# Patient Record
Sex: FEMALE | Race: BLACK OR AFRICAN AMERICAN | NOT HISPANIC OR LATINO | URBAN - METROPOLITAN AREA
[De-identification: names, ages, dates, MRNs, and addresses within clinical notes are randomized per-mention and may not be internally consistent; named-entity substitution may affect disease eponyms.]

---

## 2020-01-04 ENCOUNTER — EMERGENCY (EMERGENCY)
Facility: HOSPITAL | Age: 35
LOS: 0 days | Discharge: HOME | End: 2020-01-04
Attending: EMERGENCY MEDICINE | Admitting: EMERGENCY MEDICINE
Payer: MEDICAID

## 2020-01-04 VITALS
TEMPERATURE: 98 F | DIASTOLIC BLOOD PRESSURE: 105 MMHG | WEIGHT: 259.93 LBS | HEART RATE: 79 BPM | OXYGEN SATURATION: 100 % | SYSTOLIC BLOOD PRESSURE: 176 MMHG | RESPIRATION RATE: 19 BRPM | HEIGHT: 72 IN

## 2020-01-04 VITALS
OXYGEN SATURATION: 100 % | SYSTOLIC BLOOD PRESSURE: 128 MMHG | TEMPERATURE: 96 F | DIASTOLIC BLOOD PRESSURE: 83 MMHG | RESPIRATION RATE: 18 BRPM | HEART RATE: 68 BPM

## 2020-01-04 DIAGNOSIS — J32.9 CHRONIC SINUSITIS, UNSPECIFIED: ICD-10-CM

## 2020-01-04 DIAGNOSIS — Z87.891 PERSONAL HISTORY OF NICOTINE DEPENDENCE: ICD-10-CM

## 2020-01-04 DIAGNOSIS — R20.0 ANESTHESIA OF SKIN: ICD-10-CM

## 2020-01-04 LAB
ALBUMIN SERPL ELPH-MCNC: 4.5 G/DL — SIGNIFICANT CHANGE UP (ref 3.5–5.2)
ALP SERPL-CCNC: 68 U/L — SIGNIFICANT CHANGE UP (ref 30–115)
ALT FLD-CCNC: 20 U/L — SIGNIFICANT CHANGE UP (ref 0–41)
ANION GAP SERPL CALC-SCNC: 16 MMOL/L — HIGH (ref 7–14)
APTT BLD: 34.6 SEC — SIGNIFICANT CHANGE UP (ref 27–39.2)
AST SERPL-CCNC: 17 U/L — SIGNIFICANT CHANGE UP (ref 0–41)
BASOPHILS # BLD AUTO: 0.05 K/UL — SIGNIFICANT CHANGE UP (ref 0–0.2)
BASOPHILS NFR BLD AUTO: 0.5 % — SIGNIFICANT CHANGE UP (ref 0–1)
BILIRUB SERPL-MCNC: 0.3 MG/DL — SIGNIFICANT CHANGE UP (ref 0.2–1.2)
BUN SERPL-MCNC: 13 MG/DL — SIGNIFICANT CHANGE UP (ref 10–20)
CALCIUM SERPL-MCNC: 9.5 MG/DL — SIGNIFICANT CHANGE UP (ref 8.5–10.1)
CHLORIDE SERPL-SCNC: 100 MMOL/L — SIGNIFICANT CHANGE UP (ref 98–110)
CHOLEST SERPL-MCNC: 122 MG/DL — SIGNIFICANT CHANGE UP (ref 100–200)
CO2 SERPL-SCNC: 24 MMOL/L — SIGNIFICANT CHANGE UP (ref 17–32)
CREAT SERPL-MCNC: 1 MG/DL — SIGNIFICANT CHANGE UP (ref 0.7–1.5)
EOSINOPHIL # BLD AUTO: 0.31 K/UL — SIGNIFICANT CHANGE UP (ref 0–0.7)
EOSINOPHIL NFR BLD AUTO: 3 % — SIGNIFICANT CHANGE UP (ref 0–8)
ESTIMATED AVERAGE GLUCOSE: 105 MG/DL — SIGNIFICANT CHANGE UP (ref 68–114)
GLUCOSE SERPL-MCNC: 85 MG/DL — SIGNIFICANT CHANGE UP (ref 70–99)
HBA1C BLD-MCNC: 5.3 % — SIGNIFICANT CHANGE UP (ref 4–5.6)
HCT VFR BLD CALC: 34.3 % — LOW (ref 37–47)
HDLC SERPL-MCNC: 50 MG/DL — SIGNIFICANT CHANGE UP
HGB BLD-MCNC: 10.9 G/DL — LOW (ref 12–16)
IMM GRANULOCYTES NFR BLD AUTO: 0.3 % — SIGNIFICANT CHANGE UP (ref 0.1–0.3)
INR BLD: 1.09 RATIO — SIGNIFICANT CHANGE UP (ref 0.65–1.3)
LIPID PNL WITH DIRECT LDL SERPL: 65 MG/DL — SIGNIFICANT CHANGE UP (ref 4–129)
LYMPHOCYTES # BLD AUTO: 2.98 K/UL — SIGNIFICANT CHANGE UP (ref 1.2–3.4)
LYMPHOCYTES # BLD AUTO: 28.7 % — SIGNIFICANT CHANGE UP (ref 20.5–51.1)
MCHC RBC-ENTMCNC: 28.4 PG — SIGNIFICANT CHANGE UP (ref 27–31)
MCHC RBC-ENTMCNC: 31.8 G/DL — LOW (ref 32–37)
MCV RBC AUTO: 89.3 FL — SIGNIFICANT CHANGE UP (ref 81–99)
MONOCYTES # BLD AUTO: 0.82 K/UL — HIGH (ref 0.1–0.6)
MONOCYTES NFR BLD AUTO: 7.9 % — SIGNIFICANT CHANGE UP (ref 1.7–9.3)
NEUTROPHILS # BLD AUTO: 6.18 K/UL — SIGNIFICANT CHANGE UP (ref 1.4–6.5)
NEUTROPHILS NFR BLD AUTO: 59.6 % — SIGNIFICANT CHANGE UP (ref 42.2–75.2)
NRBC # BLD: 0 /100 WBCS — SIGNIFICANT CHANGE UP (ref 0–0)
PLATELET # BLD AUTO: 309 K/UL — SIGNIFICANT CHANGE UP (ref 130–400)
POTASSIUM SERPL-MCNC: 4.1 MMOL/L — SIGNIFICANT CHANGE UP (ref 3.5–5)
POTASSIUM SERPL-SCNC: 4.1 MMOL/L — SIGNIFICANT CHANGE UP (ref 3.5–5)
PROT SERPL-MCNC: 7.7 G/DL — SIGNIFICANT CHANGE UP (ref 6–8)
PROTHROM AB SERPL-ACNC: 12.5 SEC — SIGNIFICANT CHANGE UP (ref 9.95–12.87)
RBC # BLD: 3.84 M/UL — LOW (ref 4.2–5.4)
RBC # FLD: 13.6 % — SIGNIFICANT CHANGE UP (ref 11.5–14.5)
SODIUM SERPL-SCNC: 140 MMOL/L — SIGNIFICANT CHANGE UP (ref 135–146)
TOTAL CHOLESTEROL/HDL RATIO MEASUREMENT: 2.4 RATIO — LOW (ref 4–5.5)
TRIGL SERPL-MCNC: 50 MG/DL — SIGNIFICANT CHANGE UP (ref 10–149)
WBC # BLD: 10.37 K/UL — SIGNIFICANT CHANGE UP (ref 4.8–10.8)
WBC # FLD AUTO: 10.37 K/UL — SIGNIFICANT CHANGE UP (ref 4.8–10.8)

## 2020-01-04 PROCEDURE — 70496 CT ANGIOGRAPHY HEAD: CPT | Mod: 26

## 2020-01-04 PROCEDURE — 99236 HOSP IP/OBS SAME DATE HI 85: CPT

## 2020-01-04 PROCEDURE — 70498 CT ANGIOGRAPHY NECK: CPT | Mod: 26

## 2020-01-04 PROCEDURE — 93010 ELECTROCARDIOGRAM REPORT: CPT

## 2020-01-04 PROCEDURE — 93306 TTE W/DOPPLER COMPLETE: CPT | Mod: 26

## 2020-01-04 PROCEDURE — 70551 MRI BRAIN STEM W/O DYE: CPT | Mod: 26

## 2020-01-04 PROCEDURE — 70450 CT HEAD/BRAIN W/O DYE: CPT | Mod: 26

## 2020-01-04 NOTE — ED ADULT NURSE REASSESSMENT NOTE - NS ED NURSE REASSESS COMMENT FT1
Pt A&Ox4; admitted to Obs for numbness. Currently denies numbness or tingling. Continuous cardiac monitoring maintained. Awaiting MRI head. Will continue to monitor.

## 2020-01-04 NOTE — ED CDU PROVIDER DISPOSITION NOTE - CLINICAL COURSE
Pt presented with left facial numbness, and left upper and lower ext numbness. Placed into observation for evaluation. Neuro consult done. MRI brain. no evidence of ischemic changes. found pt to have left maxillary sinusitis. Pt also noted to have LVH. She admits to elevated blood pressure in the past. Advised wt loss, cessation of tob use, and exercise. Pt will follow up with Dr. Pepper with the reports given to her. she should begin an exercise program, wt loss and take one aspirin daily. Augmentin ordered for sinusitis.

## 2020-01-04 NOTE — ED CDU PROVIDER DISPOSITION NOTE - CARE PROVIDER_API CALL
Paulo Lovett)  Neurology  [address], [address]  [place] [address]  Phone: [phone]  Fax: [phone]  Follow Up Time: Urgent    Bo Duong ()  Internal Medicine  [address] [place] [address]  Phone: [phone]  Fax: [phone]  Follow Up Time: Urgent    Gurmeet Overton)  Cardiovascular Disease; Internal Medicine; Interventional Cardiology  [address]  [place] [address]  Phone: [phone]  Fax: [phone]  Follow Up Time: Urgent

## 2020-01-04 NOTE — ED CDU PROVIDER INITIAL DAY NOTE - PROGRESS NOTE DETAILS
Pt states she is feeling better. Still with left facial numbness. Pt did echo , mild LVH. Pt awaiting MRI. May eat. Food given by Alize MARTINEZ.

## 2020-01-04 NOTE — ED PROVIDER NOTE - CLINICAL SUMMARY MEDICAL DECISION MAKING FREE TEXT BOX
Patient presented with 45 minutes of left facial, LUE and LLE numbness, headache that came before these symptoms. On arrival to ED afebrile, HD stable, (+) diminished sensation subjectively on the left face, LUE and LLE but strength intact bilaterally. Code stroke was called from triage and neuro at bedside - requested CT head and CTA head/neck which were both performed and negative for acute CVA, hemorrhage, mass or any other emergent pathologies. Labs also grossly unremarkable for any significant electrolyte abnormalities, anemia or leukocytosis. Symptoms improved in ED. Per neuro - place in obs for MRI. Patient agreeable with plan. Will obs pending MRI and re-eval in the AM.

## 2020-01-04 NOTE — ED CDU PROVIDER INITIAL DAY NOTE - MEDICAL DECISION MAKING DETAILS
MRI MRI>>left maxillary sinusitis >. antibiotics. LVH, needs wt reduction, better blood pressure control.

## 2020-01-04 NOTE — ED CDU PROVIDER INITIAL DAY NOTE - NEURO NEGATIVE STATEMENT, MLM
no loss of consciousness, no gait abnormality, + headache, + numbness to left face with LUE and LLE, and no weakness.

## 2020-01-04 NOTE — ED CDU PROVIDER DISPOSITION NOTE - PROVIDER TOKENS
PROVIDER:[TOKEN:[[id number]],FOLLOWUP:[Urgent]],PROVIDER:[TOKEN:[[id number]],FOLLOWUP:[Urgent]],PROVIDER:[TOKEN:[[id number]],FOLLOWUP:[Urgent]]

## 2020-01-04 NOTE — ED CDU PROVIDER DISPOSITION NOTE - PATIENT PORTAL LINK FT
You can access the FollowMyHealth Patient Portal offered by [facility] by registering at the following website: [email or web address]. By joining [facility]’s FollowMyHealth portal, you will also be able to view your health information using other applications (apps) compatible with our system.

## 2020-01-04 NOTE — ED ADULT TRIAGE NOTE - CHIEF COMPLAINT QUOTE
" I have a headache and suddenly I have numbness/tingling on my left jaw &  cheek,  left arm & leg."

## 2020-01-04 NOTE — ED ADULT NURSE NOTE - NSIMPLEMENTINTERV_GEN_ALL_ED
Implemented All Universal Safety Interventions:  [place] to call system. Call bell, personal items and telephone within reach. Instruct patient to call for assistance. Room bathroom lighting operational. Non-slip footwear when patient is off stretcher. Physically safe environment: no spills, clutter or unnecessary equipment. Stretcher in lowest position, wheels locked, appropriate side rails in place.

## 2020-01-04 NOTE — CONSULT NOTE ADULT - ASSESSMENT
35 yo female, active smoker, without significant pmh presents with acute onset of headache at home followed by left facial and arm tingling.  Stroke code was activated, NIHSS is 1. CTH/CTA are unremarkable.     Plan:  Admit to obs  MRI brain [place] if symptoms persists  Lipid profile/HA1C  Telemetry          Neuroattending note will follow

## 2020-01-04 NOTE — ED CDU PROVIDER INITIAL DAY NOTE - OBJECTIVE STATEMENT
33y/o female with no significant pmh, pt. presents c/o numbness to left face, left arm and left leg which started pta. + diffuse ha which resolved but numbness still persists. denies nausea, vomiting, focal weakness, visual disturbance, slurred speech, cp, sob, abdominal pain, fever, cough. ex smoker -quit 1 year ago. no family hx of cva.

## 2020-01-04 NOTE — ED CDU PROVIDER INITIAL DAY NOTE - CONDUCTED A DETAILED DISCUSSION WITH PATIENT AND/OR GUARDIAN REGARDING, MDM
lab results/radiology results Dr. Pepper with reports/lab results/need for outpatient follow-up/radiology results

## 2020-01-04 NOTE — ED PROVIDER NOTE - OBJECTIVE STATEMENT
34y F no pmh presenting with numbness to L upper and lower extremities, and L face x45 minutes. No f/c/n/v. Headache this evening that resolved with motrin, now no longer experiencing any pain. No trauma/falls. No ataxia, difficulty speaking, visual deficits. No other symptoms.

## 2020-01-04 NOTE — ED PROVIDER NOTE - ATTENDING CONTRIBUTION TO CARE
34 year old female, no pmhx, presenting with numbness to her left upper and lower extremities along with her left face that began 45 minutes PTA. States she had a headache this evening before the numbness which has since resolved. Headache described as achy, diffuse, non-radiating, no palliative or provocative factors, gradual onset, mild severity. Denies hx headaches. Otherwise denies fevers, vision changes, weakness, confusion, URI symptoms, neck pain, chest pain, back pain, dyspnea, cough, palpitations, nausea, vomiting, abdominal pain, diarrhea, constipation, blood in stool/dark stools, urinary symptoms, vaginal bleeding/discharge, leg swelling, rash, recent travel or sick contacts.    Vital Signs: I have reviewed the initial vital signs.  Constitutional: NAD, well-nourished, appears stated age, no acute distress.  HEENT: Airway patent, moist MM, no erythema/swelling/deformity of oral structures. EOMI, PERRLA.  CV: regular rate, regular rhythm, well-perfused extremities, 2+ b/l DP and radial pulses equal.  Lungs: BCTA, no increased WOB.  ABD: NTND, no guarding or rebound, no pulsatile mass, no hernias.   MSK: Neck supple, nontender, nl ROM, no stepoff. Chest nontender. Back nontender in TLS spine or to b/l bony structures or flanks. Ext nontender, nl rom, no deformity.   INTEG: Skin warm, dry, no rash.  NEURO: A&Ox3, normal strength, (+) subjectively diminished sensation in the LUE and LLE and left face compared to the right, normal speech.   PSYCH: Calm, cooperative, normal affect and interaction.    Code stroke called from triage. Neuro at bedside, FS WNL. Will obtain labs, CT head, follow neuro recs, re-eval.

## 2020-01-04 NOTE — ED CDU PROVIDER INITIAL DAY NOTE - ATTENDING CONTRIBUTION TO CARE
Pt presents with left sided facial, upper and lower extremity numbness. Pins and needles sensation to the arms. No headache and no chest pain. On exam S1S2 rrr, lungs clear, neuro-decreased sensation to the  left lower face, left distal arm and leg. strenght is normal . Awaiting MRI> Pt presents with left sided facial, upper and lower extremity numbness. Pins and needles sensation to the arms. No headache and no chest pain. On exam S1S2 rrr, lungs clear, neuro-decreased sensation to the  left lower face, left distal arm and leg. strength is normal . Awaiting MRI>

## 2020-01-04 NOTE — CONSULT NOTE ADULT - SUBJECTIVE AND OBJECTIVE BOX
SIOBHAN BROWN    Chief Complaint: left sided numbness    right Handed    HPI: 33 yo female, active smoker, without significant pmh presents with acute onset of headache at home followed by left facial and arm tingling. Headache resolved after she took Motrin at home, but tingling persists and she came to ER. Stroke code was activated, NIHSS is 1. CTH/CTA are unremarkable. BP is elevated.  Denies dizziness, lightheadedness headache, N/V or vision changes.      Relevant PMH:  [] Prior ischemic stroke/TIA  [] Afib  []CAD  []HTN  []DLD  []DM []PVD []Obesity [] Sedintary lifestyle []CHF  []RONNA  []Cancer Hx     Social History: [] Smoking []  Drug Use: []   Alcohol Use:   [] Other:      Possible Location of Stroke:    Possible Cause of Stroke:    Relevant Cerebral Imaging:    Relevant Cervicocerebral Imaging:  CT Angio Neck w/ IV Cont:   ******PRELIMINARY REPORT******    ******PRELIMINARY REPORT******          EXAM:  CT ANGIO NECK (W)AW IC        EXAM:  CT ANGIO BRAIN (W)AW IC            PROCEDURE DATE:  01/04/2020          ******PRELIMINARY REPORT******    ******PRELIMINARY REPORT******          INTERPRETATION:  CLINICAL HISTORY/REASON FOR EXAM: Numbness.    Technique: CT angiogram of the head and neck. CTA of the head and neck was performed following the intravenous administration of 100 cc Optiray 320 (0 cc discarded) with coronal, sagittal and multiple 3-D MIP and volume rendered reformats.    Comparison: None    Findings:     NECK:  There are normal origins of the innominate, left common carotid and left subclavian artery off the aortic arch.    There is normal contrast filling the bilateral common carotid arteries with normal carotid bifurcations at C4-5 level.  There is no significant stenosis involving the bilateral internal carotid arteries.    There is normal contrast filling bilateral codominant vertebral arteries.    HEAD:  There is normal contrast filling the bilateral internal carotid arteries and middle cerebral arteries. Normal filling is noted of the bilateral anterior cerebral arteries.    There is normal filling of the bilateral PICAs and AICAs.  There is normal filling of the basilar artery, bilateral SCA and PCA vessels.    There is normal filling of small bilateral posterior communicating arteries.  There is no evidence of aneurysms or stenosis.      OTHER: Partial opacification/mucosal thickening of the left maxillary sinus.      IMPRESSION:     No evidence of acute large vessel stenosis/occlusion of the head and neck.            ******PRELIMINARY REPORT******    ******PRELIMINARY REPORT******          KAREN WILKS M.D., RESIDENT RADIOLOGIST                   (01-04-20 @ 02:57)    CT Angio Head w/ IV Cont:   ******PRELIMINARY REPORT******    ******PRELIMINARY REPORT******          EXAM:  CT ANGIO NECK (W)AW IC        EXAM:  CT ANGIO BRAIN (W)AW IC            PROCEDURE DATE:  01/04/2020          ******PRELIMINARY REPORT******    ******PRELIMINARY REPORT******          INTERPRETATION:  CLINICAL HISTORY/REASON FOR EXAM: Numbness.    Technique: CT angiogram of the head and neck. CTA of the head and neck was performed following the intravenous administration of 100 cc Optiray 320 (0 cc discarded) with coronal, sagittal and multiple 3-D MIP and volume rendered reformats.    Comparison: None    Findings:     NECK:  There are normal origins of the innominate, left common carotid and left subclavian artery off the aortic arch.    There is normal contrast filling the bilateral common carotid arteries with normal carotid bifurcations at C4-5 level.  There is no significant stenosis involving the bilateral internal carotid arteries.    There is normal contrast filling bilateral codominant vertebral arteries.    HEAD:  There is normal contrast filling the bilateral internal carotid arteries and middle cerebral arteries. Normal filling is noted of the bilateral anterior cerebral arteries.    There is normal filling of the bilateral PICAs and AICAs.  There is normal filling of the basilar artery, bilateral SCA and PCA vessels.    There is normal filling of small bilateral posterior communicating arteries.  There is no evidence of aneurysms or stenosis.      OTHER: Partial opacification/mucosal thickening of the left maxillary sinus.      IMPRESSION:     No evidence of acute large vessel stenosis/occlusion of the head and neck.            ******PRELIMINARY REPORT******    ******PRELIMINARY REPORT******          KAREN WILKS M.D., RESIDENT RADIOLOGIST                   (01-04-20 @ 02:57)        Relevant blood tests:      Relevant cardiac rhythm monitoring:    Relevant Cardiac Structure:(TTE/KENTON +/-):[]No intracardiac thrombus/[] no vegetation/[]no [place]/EF:      Home Medications:      MEDICATIONS  (STANDING):      PT/OT/Speech/Rehab/S&Swr:    Exam:    Vital Signs Last 24 Hrs  T(C): 36.7 (04 Jan 2020 01:56), Max: 36.7 (04 Jan 2020 01:56)  T(F): 98 (04 Jan 2020 01:56), Max: 98 (04 Jan 2020 01:56)  HR: 74 (04 Jan 2020 03:02) (74 - 79)  BP: 128/72 (04 Jan 2020 03:02) (128/72 - 176/105)  BP(mean): --  RR: 16 (04 Jan 2020 03:02) (16 - 19)  SpO2: 100% (04 Jan 2020 03:02) (99% - 100%)    NIHSS      LOC:       1a: 0    1b(Questions):0           1c(Instructions): 0            Best Gaze:0  Visual:0  Motor:                 RUE:  0   RLE:0     LUE:0     LLE: 0    FACE: 0    Limb Ataxia:0  Sensory: 1      Language:0       Dysarthria:  0        Extinction and Inattention:0    NIHSS on admission:          NIHSS yesterday:          NIHSS today:  1           m-RS: 0    Impression:      Suggestion:  Routine stroke workup including:    Disposition:

## 2020-01-04 NOTE — ED ADULT NURSE NOTE - OBJECTIVE STATEMENT
pt came to ED stating she began to have a sudden onset of "head pain" around 1130 PM on 1/3.   took ibuprofen, some relief noted.   began to have left sided facial numbness and tingling, left upper and lower extremity numbness and tingling   no neuro deficits noted on assessment.   reports sensory deficit to left extremities

## 2022-05-06 PROBLEM — Z00.00 ENCOUNTER FOR PREVENTIVE HEALTH EXAMINATION: Status: ACTIVE | Noted: 2022-05-06

## 2022-05-27 NOTE — ED ADULT NURSE NOTE - PAIN RATING/NUMBER SCALE (0-10): ACTIVITY
visited patient earlier today. patient is s/p palliative extubation. patient appeared comfortable no non-verbal signs of pain or discomfort noted. patient's uncle at bedside. he confirmed family received gift from [employer]. family was very appreciative of this assistance. Per uncle, mom went home. will f/u with patient's mom on Tuesday. support rendered. will follow. [id number]

## 2023-09-27 ENCOUNTER — NON-APPOINTMENT (OUTPATIENT)
Age: 38
End: 2023-09-27

## 2025-02-06 NOTE — ED CDU PROVIDER DISPOSITION NOTE - CARE PROVIDERS DIRECT ADDRESSES
Detail Level: Detailed ,[email or web address] Detail Level: Simple

## 2025-06-08 ENCOUNTER — EMERGENCY (EMERGENCY)
Facility: HOSPITAL | Age: 40
LOS: 0 days | Discharge: HOME | End: 2025-06-09
Attending: EMERGENCY MEDICINE
Payer: MEDICAID

## 2025-06-08 VITALS
HEART RATE: 95 BPM | WEIGHT: 205.03 LBS | RESPIRATION RATE: 18 BRPM | DIASTOLIC BLOOD PRESSURE: 106 MMHG | OXYGEN SATURATION: 100 % | SYSTOLIC BLOOD PRESSURE: 153 MMHG | TEMPERATURE: 98 F

## 2025-06-08 LAB
HCT VFR BLD CALC: 31.8 % — LOW (ref 37–47)
HGB BLD-MCNC: 10.2 G/DL — LOW (ref 12–16)
MCHC RBC-ENTMCNC: 27.7 PG — SIGNIFICANT CHANGE UP (ref 27–31)
MCHC RBC-ENTMCNC: 32.1 G/DL — SIGNIFICANT CHANGE UP (ref 32–37)
MCV RBC AUTO: 86.4 FL — SIGNIFICANT CHANGE UP (ref 81–99)
PLATELET # BLD AUTO: 293 K/UL — SIGNIFICANT CHANGE UP (ref 130–400)
PMV BLD: 10.1 FL — SIGNIFICANT CHANGE UP (ref 7.4–10.4)
RBC # BLD: 3.68 M/UL — LOW (ref 4.2–5.4)
RBC # FLD: 21.6 % — HIGH (ref 11.5–14.5)
WBC # BLD: 7.21 K/UL — SIGNIFICANT CHANGE UP (ref 4.8–10.8)
WBC # FLD AUTO: 7.21 K/UL — SIGNIFICANT CHANGE UP (ref 4.8–10.8)

## 2025-06-08 PROCEDURE — 36415 COLL VENOUS BLD VENIPUNCTURE: CPT

## 2025-06-08 PROCEDURE — 93010 ELECTROCARDIOGRAM REPORT: CPT

## 2025-06-08 PROCEDURE — 84703 CHORIONIC GONADOTROPIN ASSAY: CPT

## 2025-06-08 PROCEDURE — 71046 X-RAY EXAM CHEST 2 VIEWS: CPT | Mod: 26

## 2025-06-08 PROCEDURE — 80053 COMPREHEN METABOLIC PANEL: CPT

## 2025-06-08 PROCEDURE — 71046 X-RAY EXAM CHEST 2 VIEWS: CPT

## 2025-06-08 PROCEDURE — 85025 COMPLETE CBC W/AUTO DIFF WBC: CPT

## 2025-06-08 PROCEDURE — 93005 ELECTROCARDIOGRAM TRACING: CPT

## 2025-06-08 PROCEDURE — 99285 EMERGENCY DEPT VISIT HI MDM: CPT | Mod: 25

## 2025-06-08 PROCEDURE — 99285 EMERGENCY DEPT VISIT HI MDM: CPT

## 2025-06-08 PROCEDURE — 83735 ASSAY OF MAGNESIUM: CPT

## 2025-06-08 PROCEDURE — 36000 PLACE NEEDLE IN VEIN: CPT

## 2025-06-08 PROCEDURE — 84484 ASSAY OF TROPONIN QUANT: CPT

## 2025-06-09 VITALS
RESPIRATION RATE: 18 BRPM | HEART RATE: 77 BPM | SYSTOLIC BLOOD PRESSURE: 145 MMHG | OXYGEN SATURATION: 100 % | TEMPERATURE: 98 F | DIASTOLIC BLOOD PRESSURE: 91 MMHG

## 2025-06-09 PROBLEM — Z78.9 OTHER SPECIFIED HEALTH STATUS: Chronic | Status: ACTIVE | Noted: 2020-01-04

## 2025-06-09 LAB
ALBUMIN SERPL ELPH-MCNC: 4.6 G/DL — SIGNIFICANT CHANGE UP (ref 3.5–5.2)
ALP SERPL-CCNC: 63 U/L — SIGNIFICANT CHANGE UP (ref 30–115)
ALT FLD-CCNC: 27 U/L — SIGNIFICANT CHANGE UP (ref 0–41)
ANION GAP SERPL CALC-SCNC: 9 MMOL/L — SIGNIFICANT CHANGE UP (ref 7–14)
AST SERPL-CCNC: 22 U/L — SIGNIFICANT CHANGE UP (ref 0–41)
BASOPHILS # BLD AUTO: 0.04 K/UL — SIGNIFICANT CHANGE UP (ref 0–0.2)
BASOPHILS NFR BLD AUTO: 0.6 % — SIGNIFICANT CHANGE UP (ref 0–1)
BILIRUB SERPL-MCNC: 0.4 MG/DL — SIGNIFICANT CHANGE UP (ref 0.2–1.2)
BUN SERPL-MCNC: 15 MG/DL — SIGNIFICANT CHANGE UP (ref 10–20)
CALCIUM SERPL-MCNC: 9.5 MG/DL — SIGNIFICANT CHANGE UP (ref 8.4–10.5)
CHLORIDE SERPL-SCNC: 107 MMOL/L — SIGNIFICANT CHANGE UP (ref 98–110)
CO2 SERPL-SCNC: 23 MMOL/L — SIGNIFICANT CHANGE UP (ref 17–32)
CREAT SERPL-MCNC: 0.7 MG/DL — SIGNIFICANT CHANGE UP (ref 0.7–1.5)
EGFR: 111 ML/MIN/1.73M2 — SIGNIFICANT CHANGE UP
EGFR: 111 ML/MIN/1.73M2 — SIGNIFICANT CHANGE UP
EOSINOPHIL # BLD AUTO: 0.15 K/UL — SIGNIFICANT CHANGE UP (ref 0–0.7)
EOSINOPHIL NFR BLD AUTO: 2.1 % — SIGNIFICANT CHANGE UP (ref 0–8)
GLUCOSE SERPL-MCNC: 92 MG/DL — SIGNIFICANT CHANGE UP (ref 70–99)
HCG SERPL QL: NEGATIVE — SIGNIFICANT CHANGE UP
IMM GRANULOCYTES NFR BLD AUTO: 0.3 % — SIGNIFICANT CHANGE UP (ref 0.1–0.3)
LYMPHOCYTES # BLD AUTO: 1.46 K/UL — SIGNIFICANT CHANGE UP (ref 1.2–3.4)
LYMPHOCYTES # BLD AUTO: 20.2 % — LOW (ref 20.5–51.1)
MONOCYTES # BLD AUTO: 0.48 K/UL — SIGNIFICANT CHANGE UP (ref 0.1–0.6)
MONOCYTES NFR BLD AUTO: 6.7 % — SIGNIFICANT CHANGE UP (ref 1.7–9.3)
NEUTROPHILS # BLD AUTO: 5.06 K/UL — SIGNIFICANT CHANGE UP (ref 1.4–6.5)
NEUTROPHILS NFR BLD AUTO: 70.1 % — SIGNIFICANT CHANGE UP (ref 42.2–75.2)
NRBC BLD AUTO-RTO: 0 /100 WBCS — SIGNIFICANT CHANGE UP (ref 0–0)
POTASSIUM SERPL-MCNC: 3.7 MMOL/L — SIGNIFICANT CHANGE UP (ref 3.5–5)
POTASSIUM SERPL-SCNC: 3.7 MMOL/L — SIGNIFICANT CHANGE UP (ref 3.5–5)
PROT SERPL-MCNC: 7 G/DL — SIGNIFICANT CHANGE UP (ref 6–8)
SODIUM SERPL-SCNC: 139 MMOL/L — SIGNIFICANT CHANGE UP (ref 135–146)
TROPONIN T, HIGH SENSITIVITY RESULT: 6 NG/L — SIGNIFICANT CHANGE UP (ref 6–13)
TROPONIN T, HIGH SENSITIVITY RESULT: <6 NG/L — SIGNIFICANT CHANGE UP (ref 6–13)

## 2025-06-09 PROCEDURE — 93010 ELECTROCARDIOGRAM REPORT: CPT

## 2025-06-09 RX ORDER — ACETAMINOPHEN 500 MG/5ML
975 LIQUID (ML) ORAL ONCE
Refills: 0 | Status: COMPLETED | OUTPATIENT
Start: 2025-06-09 | End: 2025-06-09

## 2025-06-09 RX ADMIN — Medication 975 MILLIGRAM(S): at 02:10

## 2025-06-09 NOTE — ED PROVIDER NOTE - OBJECTIVE STATEMENT
Patient is a 40-year-old female PMH anemia who presents to the ED with complaints of palpitations that began this evening while smoking marijuana.  Patient reports she had episode of heart racing sensation which has since resolved.  Patient also reports she has been stressed, she recently started school. +FH of cardiac arrest in brother, aged in his 30s; unknown cause as per patient but states he "did not take care of his health."  Denies fever, chills, dizziness, syncope, shortness of breath, nausea, vomiting, abdominal pain.  Denies recent travel, recent surgery, history of PE or DVT, or hormone use.

## 2025-06-09 NOTE — ED PROVIDER NOTE - PATIENT PORTAL LINK FT
You can access the FollowMyHealth Patient Portal offered by [facility] by registering at the following website: [email or web address]. By joining [employer]’s FollowMyHealth portal, you will also be able to view your health information using other applications (apps) compatible with our system.

## 2025-06-09 NOTE — ED PROVIDER NOTE - EKG/XRAY ADDITIONAL INFORMATION
EKG reviewed and interpreted by me. EKG shows Sinus rhythm, intervals are in acceptable range, and no significant ST/T wave changes noted.  Chest X-rays reviewed and interpreted by me Dr. Ortega and shows no actue findings. No Pneumothorax, no free air, no effusions, and these findings discussed with patient.

## 2025-06-09 NOTE — ED PROVIDER NOTE - PHYSICAL EXAMINATION
VITAL SIGNS: I have reviewed nursing notes and confirm.  CONSTITUTIONAL: In no acute distress.  SKIN: Skin exam is warm and dry  HEAD: Normocephalic; atraumatic.  EYES: PERRL, EOM intact; conjunctiva and sclera clear.  ENT: No nasal discharge; airway clear.  NECK: Supple; non tender.  CARD: S1, S2; Regular rate and rhythm.  RESP: No wheezes, rales or rhonchi. Speaking in full sentences.   ABD: Soft, non-tender.   EXT: Normal ROM. No LE edema or calf tenderness.   NEURO: Alert, oriented. Grossly unremarkable. No focal deficits.

## 2025-06-09 NOTE — ED PROVIDER NOTE - DIFFERENTIAL DIAGNOSIS
Electrolyte abnormalities, dehydration, ADRIEL, hyperglycemia, hypoglycemia, symptomatic anemia.  r/o cardiac arrhythmia; r/o cardiomegaly. Differential Diagnosis

## 2025-06-09 NOTE — ED PROVIDER NOTE - NSFOLLOWUPINSTRUCTIONS_ED_ALL_ED_FT
Please follow with your PCP ASAP for reevaluation and reminder of the care.   Please return to ED immediately for any chest pain, trouble breathing, nausea, vomiting, headache, dizziness, abdominal pain, or any other symptoms/concerns.    Palpitations    A palpitation is the feeling that your heartbeat is irregular or is faster than normal. It may feel like your heart is fluttering or skipping a beat. They may be caused by many things, including smoking, caffeine, alcohol, stress, and certain medicines. Although most causes of palpitations are not serious, palpitations can be a sign of a serious medical problem. Avoid caffeine, alcohol, tobacco products at home. Try to reduce stress and anxiety, and make sure to get plenty of rest.     SEEK IMMEDIATE MEDICAL CARE IF YOU HAVE THE FOLLOWING SYMPTOMS: chest pain, shortness of breath, severe headache, or dizziness/lightheadedness.

## 2025-06-09 NOTE — ED PROVIDER NOTE - ATTENDING APP SHARED VISIT CONTRIBUTION OF CARE
I have personally performed a history and physical exam on this patient. I have personally directed the management of the patient.  Patient presents to ED c/o palpitations, s/p marijuana, use.   Patient Brother had cardiac problems, does not know the details.   Vitals reviewed.   Patient is awake, alert, answering questions appropriately, appears comfortable and not in any distress.  Lungs: CTA, no wheezing, no crackles.  Heart: s1, s2, rrr, no M/G.

## 2025-06-24 NOTE — ED CDU PROVIDER INITIAL DAY NOTE - MUSCULOSKELETAL, MLM
Patient given prescription, discharge instructions verbal and written, patient verbalized understanding.  Alert/oriented X4, Clear speech.  Patient exhibits no distress, ambulates with steady gait per self leaving unit, no further request.  
Patient to ed per [facility] for complaints of vaginal itching and discharge which started several days ago after intercourse.  
Spine appears normal, range of motion is not limited, no muscle or joint tenderness